# Patient Record
Sex: MALE | Race: WHITE | NOT HISPANIC OR LATINO | Employment: FULL TIME | URBAN - METROPOLITAN AREA
[De-identification: names, ages, dates, MRNs, and addresses within clinical notes are randomized per-mention and may not be internally consistent; named-entity substitution may affect disease eponyms.]

---

## 2020-07-07 ENCOUNTER — OCCUPATIONAL HEALTH (OUTPATIENT)
Dept: URGENT CARE | Facility: CLINIC | Age: 50
End: 2020-07-07
Payer: OTHER GOVERNMENT

## 2020-07-07 DIAGNOSIS — Z02.1 PRE-EMPLOYMENT EXAMINATION: Primary | ICD-10-CM

## 2020-07-07 PROCEDURE — 80305 DRUG TEST PRSMV DIR OPT OBS: CPT | Mod: S$GLB,,, | Performed by: NURSE PRACTITIONER

## 2020-07-07 PROCEDURE — 99499 PHYSICAL, BASIC COMPLEXITY: ICD-10-PCS | Mod: S$GLB,,, | Performed by: NURSE PRACTITIONER

## 2020-07-07 PROCEDURE — 99499 UNLISTED E&M SERVICE: CPT | Mod: S$GLB,,, | Performed by: NURSE PRACTITIONER

## 2020-07-07 PROCEDURE — 80305 OOH NON-DOT DRUG SCREEN: ICD-10-PCS | Mod: S$GLB,,, | Performed by: NURSE PRACTITIONER

## 2020-12-07 ENCOUNTER — OFFICE VISIT (OUTPATIENT)
Dept: URGENT CARE | Facility: CLINIC | Age: 50
End: 2020-12-07
Payer: OTHER GOVERNMENT

## 2020-12-07 VITALS
HEIGHT: 74 IN | TEMPERATURE: 98 F | HEART RATE: 79 BPM | OXYGEN SATURATION: 98 % | BODY MASS INDEX: 22.46 KG/M2 | DIASTOLIC BLOOD PRESSURE: 80 MMHG | RESPIRATION RATE: 19 BRPM | SYSTOLIC BLOOD PRESSURE: 136 MMHG | WEIGHT: 175 LBS
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
## 2020-12-07 DIAGNOSIS — M62.830 BACK MUSCLE SPASM: ICD-10-CM

## 2020-12-07 DIAGNOSIS — M54.50 ACUTE LOW BACK PAIN WITHOUT SCIATICA, UNSPECIFIED BACK PAIN LATERALITY: ICD-10-CM

## 2020-12-07 DIAGNOSIS — S39.012A STRAIN OF LUMBAR REGION, INITIAL ENCOUNTER: Primary | ICD-10-CM

## 2020-12-07 PROCEDURE — 72110 X-RAY EXAM L-2 SPINE 4/>VWS: CPT | Mod: FY,S$GLB,, | Performed by: RADIOLOGY

## 2020-12-07 PROCEDURE — 99204 OFFICE O/P NEW MOD 45 MIN: CPT | Mod: S$GLB,,, | Performed by: NURSE PRACTITIONER

## 2020-12-07 PROCEDURE — 72110 XR LUMBAR SPINE COMPLETE 5 VIEW: ICD-10-PCS | Mod: FY,S$GLB,, | Performed by: RADIOLOGY

## 2020-12-07 PROCEDURE — 99204 PR OFFICE/OUTPT VISIT, NEW, LEVL IV, 45-59 MIN: ICD-10-PCS | Mod: S$GLB,,, | Performed by: NURSE PRACTITIONER

## 2020-12-07 RX ORDER — NAPROXEN 500 MG/1
500 TABLET ORAL 2 TIMES DAILY
Qty: 60 TABLET | Refills: 0 | Status: SHIPPED | OUTPATIENT
Start: 2020-12-07

## 2020-12-07 RX ORDER — TIZANIDINE 4 MG/1
4 TABLET ORAL NIGHTLY PRN
Qty: 30 TABLET | Refills: 1 | Status: SHIPPED | OUTPATIENT
Start: 2020-12-07 | End: 2020-12-14 | Stop reason: SDUPTHER

## 2020-12-07 NOTE — LETTER
[facility]  [address] [place] [address]  Phone: [phone]  Fax: [phone]  [facility]: [phone]    Pt Name: Christopher J Mendel  Injury Date: 12/07/2020   Employee ID: [id number] Date of First Treatment: 12/07/2020   Company: [employer]      Appointment Time: 11:50 AM Arrived: 11:30 a.m.   Provider: Donte Ramírez NP Time Out: 1:41 p.m.     Office Treatment:   1. Strain of lumbar region, initial encounter    2. Acute low back pain without sciatica, unspecified back pain laterality    3. Work related injury    4. Back muscle spasm      Medications Ordered This Encounter   Medications    naproxen (NAPROSYN) 500 MG tablet    tiZANidine (ZANAFLEX) 4 MG tablet      Patient Instructions: Attention not to aggravate affected area, Daily home exercises/warm soaks, Apply ice 24-48 hours then apply heat/warm soaks    Restrictions: No lifting/pushing/pulling more than 10 lbs, Avoid frequent bending/lifting/twisting     Return Appointment: 12/14/2020 at 11:30 a.m.  [place]

## 2020-12-07 NOTE — PROGRESS NOTES
Subjective:       Patient ID: Christopher J Mendel is a 50 y.o. male.    Chief Complaint: Back Pain    Pt works for [employer] as a . Pt states he was at work on 12/3/2020 throwing away trash and noticed the dumpster trash was to high, so he jumped in it to smash the trash and pulled  a muscle in his back/love handle. Pt states he did not start feeling the pain until the next day. He was not seen anywhere for this injury, this is his initial visit. Pain today is a 4/10 constant. Pain worse when   sitting and trying to stand up. IJ    Back Pain  This is a new problem. The current episode started in the past 7 days. The problem occurs constantly. The problem is unchanged. The pain is present in the lumbar spine and thoracic spine. The quality of the pain is described as aching. The pain does not radiate. The pain is at a severity of 4/10. The pain is mild. The pain is the same all the time. The symptoms are aggravated by sitting, standing and bending. Pertinent negatives include no abdominal pain, bladder incontinence, bowel incontinence, chest pain, dysuria, numbness, tingling or weight loss. He has tried nothing for the symptoms. The treatment provided no relief.       Constitution: Negative for chills and fatigue.   HENT: Negative.    Neck: negative.   Cardiovascular: Negative.  Negative for chest pain.   Eyes: Negative.  Negative for vision loss and double vision.   Respiratory: Negative.  Negative for chest tightness and cough.    Gastrointestinal: Negative for abdominal pain and bowel incontinence.   Endocrine: negative.   Genitourinary: Negative for dysuria, urgency, bladder incontinence and hematuria.   Musculoskeletal: Positive for pain, joint pain, abnormal ROM of joint, back pain, muscle cramps (SPASMS) and muscle ache. Negative for trauma and history of spine disorder.   Skin: Negative.  Negative for rash, wound, erythema and bruising.   Allergic/Immunologic: Negative.    Neurological:  Negative.  Negative for coordination disturbances, numbness and tingling.   Hematologic/Lymphatic: Negative.    Psychiatric/Behavioral: Negative.         Objective:      Physical Exam  Vitals signs and nursing note reviewed.   Constitutional:       Appearance: He is normal weight.   HENT:      Head: Normocephalic and atraumatic.   Eyes:      Extraocular Movements: Extraocular movements intact.      Conjunctiva/sclera: Conjunctivae normal.      Pupils: Pupils are equal, round, and reactive to light.   Neck:      Musculoskeletal: Normal range of motion and neck supple.   Cardiovascular:      Rate and Rhythm: Normal rate and regular rhythm.   Pulmonary:      Effort: Pulmonary effort is normal.      Breath sounds: Normal breath sounds.   Abdominal:      General: Bowel sounds are normal.      Palpations: Abdomen is soft.   Musculoskeletal:         General: Tenderness present.      Right hip: Normal.      Left hip: Normal.      Thoracic back: Normal.      Lumbar back: He exhibits decreased range of motion, tenderness, pain and spasm. He exhibits no bony tenderness and no swelling.        Back:       Comments: PARASPINAL TTP LUMBAR SPINE.  DECREASED FLEXION AND EXTENSION. NEGATIVE SLR BILATERALLY. DTR 2 PLUS BILATERAL.  SKIN NORMAL EXAM, INTACT WARM AND DRY.    Skin:     General: Skin is warm and dry.      Capillary Refill: Capillary refill takes less than 2 seconds.      Findings: No erythema.   Neurological:      General: No focal deficit present.      Mental Status: He is alert and oriented to person, place, and time.      GCS: GCS eye subscore is 4. GCS verbal subscore is 5. GCS motor subscore is 6.      Cranial Nerves: Cranial nerves are intact.      Motor: Motor function is intact.      Coordination: Coordination is intact.      Gait: Gait is intact.      Deep Tendon Reflexes: Reflexes are normal and symmetric.   Psychiatric:         Attention and Perception: Attention normal.         Mood and Affect: Mood normal.          Behavior: Behavior normal. Behavior is cooperative.         Cognition and Memory: Cognition normal.         Assessment:       1. Strain of lumbar region, initial encounter    2. Acute low back pain without sciatica, unspecified back pain laterality    3. Work related injury    4. Back muscle spasm         X-ray Lumbar Spine Complete 5 View    Result Date: 12/7/2020  EXAMINATION: XR LUMBAR SPINE COMPLETE 5 VIEW CLINICAL HISTORY: Abnormal posture;LUMBAR PAIN;  Strain of muscle, fascia and tendon of lower back, initial encounter FINDINGS: Lumbar spine five views: There is mild DJD.  No fracture dislocation bone destruction or pars defect seen.  No instability seen.     No acute process seen. Electronically signed by: Cleveland Gee MD Date:    12/07/2020 Time:    13:35  Plan:     Jose was seen today for back pain.    Diagnoses and all orders for this visit:    Strain of lumbar region, initial encounter  -     X-Ray Lumbar Spine Complete 5 View; Future  -     tiZANidine (ZANAFLEX) 4 MG tablet; Take 1 tablet (4 mg total) by mouth nightly as needed (MUSCLE SPASMS). DO NOT DRIVE WHILE TAKING THIS MEDICATION  -     naproxen (NAPROSYN) 500 MG tablet; Take 1 tablet (500 mg total) by mouth 2 (two) times daily.    Acute low back pain without sciatica, unspecified back pain laterality  -     X-Ray Lumbar Spine Complete 5 View; Future  -     tiZANidine (ZANAFLEX) 4 MG tablet; Take 1 tablet (4 mg total) by mouth nightly as needed (MUSCLE SPASMS). DO NOT DRIVE WHILE TAKING THIS MEDICATION  -     naproxen (NAPROSYN) 500 MG tablet; Take 1 tablet (500 mg total) by mouth 2 (two) times daily.    Work related injury  -     X-Ray Lumbar Spine Complete 5 View; Future    Back muscle spasm  -     X-Ray Lumbar Spine Complete 5 View; Future  -     naproxen (NAPROSYN) 500 MG tablet; Take 1 tablet (500 mg total) by mouth 2 (two) times daily.        Medications Ordered This Encounter   Medications    naproxen (NAPROSYN) 500 MG tablet      Sig: Take 1 tablet (500 mg total) by mouth 2 (two) times daily.     Dispense:  60 tablet     Refill:  0    tiZANidine (ZANAFLEX) 4 MG tablet     Sig: Take 1 tablet (4 mg total) by mouth nightly as needed (MUSCLE SPASMS). DO NOT DRIVE WHILE TAKING THIS MEDICATION     Dispense:  30 tablet     Refill:  1     Patient Instructions: Attention not to aggravate affected area   Restrictions: No lifting/pushing/pulling more than 10 lbs, Avoid frequent bending/lifting/twisting  Follow up in about 1 week (around 12/14/2020).

## 2020-12-07 NOTE — PATIENT INSTRUCTIONS
Back Basics: A Healthy Spine  A healthy spine supports the body while letting it move freely. It does this with the help of three natural curves. Strong, flexible muscles help, too. They support the spine by keeping its curves properly aligned. The disks that cushion the bones of your spine also play a role in back fitness.    Three natural curves  The spine is made of bones (vertebrae) and pads of soft tissue (disks). These parts are arranged in three curves: cervical, thoracic, and lumbar. When properly aligned, these curves keep your body balanced. They also support your body when you move. By distributing your weight throughout your spine, the curves make back injuries less likely.  Strong, flexible muscles  Strong, flexible back muscles help support the three curves of the spine. They do so by holding the vertebrae and disks in proper alignment. Strong, flexible abdominal, hip, and leg muscles also reduce strain on the back.  The lumbar curve  The lumbar curve is the hardest-working part of the spine. It carries more weight and moves the most. Aligning this curve helps prevent damage to vertebrae, disks, and other parts of the spine.  Cushioning disks  Disks are the soft pads of tissue between the vertebrae. The disks absorb shock caused by movement. Each disk has a spongy center (nucleus) and a tougher outer ring (annulus). Movement within the nucleus allows the vertebrae to rock back and forth on the disks. This provides the flexibility needed to bend and move.       Date Last Reviewed: 10/18/2015  © 9708-2320 [employer]. [address] [place] [address]. All rights reserved. This information is not intended as a substitute for professional medical care. Always follow your healthcare professional's instructions.        Back Spasm (No Trauma)    Spasm of the back muscles can occur after a sudden forceful twisting or bending force (such as in a car accident), after a simple awkward  movement, or after lifting something heavy with poor body positioning. In any case, muscle spasm adds to the pain. Sleeping in an awkward position or on a poor quality mattress can also cause this. Some people respond to emotional stress by tensing the muscles of their back.  Pain that continues may need further evaluation or other types of treatment such as physical therapy.  You don't always need X-rays for the initial evaluation of back pain, unless you had a physical injury such as from a car accident or fall. If your pain continues and doesn't respond to medical treatment, X-rays and other tests may then be done.   Home care  · As soon as possible, start sitting or walking again to avoid problems from prolonged bed rest (muscle weakness, worsening back stiffness and pain, blood clots in the legs).  · When in bed, try to find a position of comfort. A firm mattress is best. Try lying flat on your back with pillows under your knees. You can also try lying on your side with your knees bent up toward your chest and a pillow between your knees.  · Avoid prolonged sitting, long car rides, or travel. This puts more stress on the lower back than standing or walking.   · During the first 24 to 72 hours after an injury or flare-up, apply an ice pack to the painful area for 20 minutes, then remove it for 20 minutes. Do this over a period of 60 to 90 minutes or several times a day. This will reduce swelling and pain. Always wrap ice packs in a thin towel.  · You can start with ice, then switch to heat. Heat (hot shower, hot bath, or heating pad) reduces pain, and works well for muscle spasms. Apply heat to the painful area for 20 minutes, then remove it for 20 minutes. Do this over a period of 60 to 90 minutes or several times a day. Do not sleep on a heating pad as it can burn or damage skin.  · Alternate ice and heat therapies.  · Be aware of safe lifting methods and do not lift anything over 15 pounds until all the pain  is gone.  Gentle stretching will help your back heal faster. Do this simple routine 2 to 3 times a day until your back is feeling better.  · Lie on your back with your knees bent and both feet on the ground  · Slowly raise your left knee to your chest as you flatten your lower back against the floor. Hold for 20 to 30 seconds.  · Relax and repeat the exercise with your right knee.  · Do 2 to 3 of these exercises for each leg.  · Repeat, hugging both knees to your chest at the same time.  · Do not bounce, but use a gentle pull.  Medicines  Talk to your doctor before using medicine, especially if you have other medical problems or are taking other medicines.  You may use acetaminophen or ibuprofen to control pain, unless your healthcare provider prescribed another pain medicine. If you have a chronic condition such as diabetes, liver or kidney disease, stomach ulcer, or gastrointestinal bleeding, or are taking blood thinners, talk with your healthcare provider before taking any medicines.  Be careful if you are given prescription pain medicine, narcotics, or medicine for muscle spasm. They can cause drowsiness, affect your coordination, reflexes, or judgment. Do not drive or operate heavy machinery when taking these medicines. Take pain medicine only as prescribed by your healthcare provider.  Follow-up care  Follow up with your doctor, or as advised. Physical therapy or further tests may be needed.  If X-rays were taken, they may be reviewed by a radiologist. You will be notified of any new findings that may affect your care.  Call 911  Seek emergency medical care if any of these occur:  · Trouble breathing  · Confusion  · Drowsiness or trouble awakening  · Fainting or loss of consciousness  · Rapid or very slow heart rate  · Loss of bowel or bladder control  When to seek medical advice  Call your healthcare provider right away if any of these occur:  · Pain becomes worse or spreads to your legs  · Weakness or  numbness in one or both legs  · Numbness in the groin or genital area  · Unexplained fever over 100.4ºF (38.0ºC)  · Burning or pain when passing urine  Date Last Reviewed: 6/1/2016  © 7812-5236 [employer]. [address] [place] [address]. All rights reserved. This information is not intended as a substitute for professional medical care. Always follow your healthcare professional's instructions.        Relieving Tension in Your Back  Being relaxed helps keep your mind healthy and your back ready to move. Take short breaks often. Walk around. Stretch. Switch tasks. Also give the following a try.  Make time to relax. Start by setting aside 5 minutes daily.   Deep breathing    Deep breathing is a simple way to reduce stress. You can do it almost any time you need to relax.  · Inhale slowly through your nose. Let your lungs and stomach expand.  · Hold your breath for 2 to 3 seconds.  · Exhale slowly through your mouth until your lungs feel empty. Repeat 3 to 4 times.  Relieve tension  Muscle tension can create tender spots called trigger points. The tips below may help relieve muscle tension.  · Press the trigger point if you can reach it. If not, lie on a soft tennis ball, or ask a friend to press the spot. Use steady pressure for 10 to 15 seconds. Breathe deeply. Repeat a few times.  · Massage trigger points with ice for 2 to 5 minutes. Press lightly at first. Slowly increase firmness.  Date Last Reviewed: 10/18/2015  © 9208-4718 [employer]. [address] [place] [address]. All rights reserved. This information is not intended as a substitute for professional medical care. Always follow your healthcare professional's instructions.

## 2020-12-10 ENCOUNTER — LAB VISIT (OUTPATIENT)
Dept: PRIMARY CARE CLINIC | Facility: OTHER | Age: 50
End: 2020-12-10
Attending: INTERNAL MEDICINE
Payer: OTHER GOVERNMENT

## 2020-12-10 DIAGNOSIS — Z03.818 ENCOUNTER FOR OBSERVATION FOR SUSPECTED EXPOSURE TO OTHER BIOLOGICAL AGENTS RULED OUT: ICD-10-CM

## 2020-12-10 PROCEDURE — U0003 INFECTIOUS AGENT DETECTION BY NUCLEIC ACID (DNA OR RNA); SEVERE ACUTE RESPIRATORY SYNDROME CORONAVIRUS 2 (SARS-COV-2) (CORONAVIRUS DISEASE [COVID-19]), AMPLIFIED PROBE TECHNIQUE, MAKING USE OF HIGH THROUGHPUT TECHNOLOGIES AS DESCRIBED BY CMS-2020-01-R: HCPCS

## 2020-12-12 DIAGNOSIS — U07.1 COVID-19 VIRUS DETECTED: ICD-10-CM

## 2020-12-12 LAB — SARS-COV-2 RNA RESP QL NAA+PROBE: DETECTED

## 2020-12-14 ENCOUNTER — OFFICE VISIT (OUTPATIENT)
Dept: URGENT CARE | Facility: CLINIC | Age: 50
End: 2020-12-14
Payer: OTHER MISCELLANEOUS
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
## 2020-12-14 DIAGNOSIS — S39.012D ACUTE MYOFASCIAL STRAIN OF LUMBAR REGION, SUBSEQUENT ENCOUNTER: Primary | ICD-10-CM

## 2020-12-14 PROCEDURE — 99214 PR OFFICE/OUTPT VISIT, EST, LEVL IV, 30-39 MIN: ICD-10-PCS | Mod: S$GLB,,, | Performed by: PHYSICIAN ASSISTANT

## 2020-12-14 PROCEDURE — 99214 OFFICE O/P EST MOD 30 MIN: CPT | Mod: S$GLB,,, | Performed by: PHYSICIAN ASSISTANT

## 2020-12-14 RX ORDER — LANOLIN ALCOHOL/MO/W.PET/CERES
2000 CREAM (GRAM) TOPICAL DAILY
Qty: 30 TABLET | Refills: 0 | Status: SHIPPED | OUTPATIENT
Start: 2020-12-14

## 2020-12-14 RX ORDER — PYRIDOXINE HCL (VITAMIN B6) 100 MG
200 TABLET ORAL DAILY
Qty: 30 TABLET | Refills: 0 | Status: SHIPPED | OUTPATIENT
Start: 2020-12-14

## 2020-12-14 RX ORDER — LANOLIN ALCOHOL/MO/W.PET/CERES
200 CREAM (GRAM) TOPICAL DAILY
Qty: 30 TABLET | Refills: 0 | Status: SHIPPED | OUTPATIENT
Start: 2020-12-14

## 2020-12-14 RX ORDER — TIZANIDINE 4 MG/1
4 TABLET ORAL NIGHTLY PRN
Qty: 20 TABLET | Refills: 0 | Status: SHIPPED | OUTPATIENT
Start: 2020-12-14

## 2020-12-14 NOTE — LETTER
[facility]  [address]  [place] [address]  Phone: [phone]  Fax: [phone]  [facility]: [id number]    Pt Name: Christopher J Mendel  Injury Date: 12/07/2020   Employee ID: [id number] Date of  Treatment: 12/14/2020   Company:[email or web address]      Appointment Time: 11:15 AM Arrived: 12:45 PM   Provider: Brandon Sheldon PA-C Time Out: 1:15 PM     Office Treatment:     1. Acute myofascial strain of lumbar region, subsequent encounter    2. Work related injury      Medications Ordered This Encounter   Medications    cyanocobalamin (VITAMIN B-12) 1000 MCG tablet    pyridoxine, vitamin B6, (VITAMIN B-6) 100 MG Tab    thiamine (VITAMIN B-1) 100 MG tablet    tiZANidine (ZANAFLEX) 4 MG tablet      Patient Instructions: Daily home exercises/warm soaks    Restrictions: Avoid frequent bending/lifting/twisting, No lifting/pushing/pulling more than 10 lbs     Return Appointment: 12/21/2020 at 10:30 AM  [place]

## 2020-12-14 NOTE — PROGRESS NOTES
Subjective:       Patient ID: Christopher J Mendel is a 50 y.o. male.    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 His pain today is a 3/10 only because he lifted a tire to put it on the back of a truck. Pt is taking Naproxen and Zanaflex for pain.  IJ    Back Pain  This is a recurrent problem. The current episode started 1 to 4 weeks ago. The problem occurs constantly. The problem is unchanged. The pain is present in the lumbar spine and thoracic spine. The quality of the pain is described as aching. The pain does not radiate. The pain is at a severity of 3/10. The pain is mild. The pain is the same all the time. The symptoms are aggravated by sitting, standing and bending. Pertinent negatives include no abdominal pain, bladder incontinence, bowel incontinence, chest pain, dysuria, numbness, tingling or weight loss. He has tried nothing for the symptoms. The treatment provided no relief.       Constitution: Negative for chills and fatigue.   HENT: Negative.    Neck: negative.   Cardiovascular: Negative.  Negative for chest pain.   Eyes: Negative.  Negative for vision loss and double vision.   Respiratory: Negative.  Negative for chest tightness and cough.    Gastrointestinal: Negative for abdominal pain and bowel incontinence.   Endocrine: negative.   Genitourinary: Negative for dysuria, urgency, bladder incontinence and hematuria.   Musculoskeletal: Positive for pain and back pain. Negative for trauma, joint pain, abnormal ROM of joint, muscle cramps (SPASMS), muscle ache and history of spine disorder.   Skin: Negative.  Negative for rash, wound, erythema and bruising.   Allergic/Immunologic: Negative.    Neurological: Negative.  Negative for coordination disturbances, numbness and tingling.   Hematologic/Lymphatic: Negative.    Psychiatric/Behavioral: Negative.         Objective:       Physical Exam  Nursing note reviewed.   Constitutional:       General: He is not in acute distress.     Appearance: Normal appearance. He is well-developed.   HENT:      Head: Normocephalic and atraumatic.      Right Ear: Hearing and external ear normal.      Left Ear: Hearing and external ear normal.      Nose: Nose normal. No nasal deformity.   Eyes:      General: Lids are normal.      Conjunctiva/sclera: Conjunctivae normal.      Right eye: Right conjunctiva is not injected.      Left eye: Left conjunctiva is not injected.   Neck:      Musculoskeletal: Normal range of motion. No spinous process tenderness or muscular tenderness.      Trachea: Trachea normal.   Cardiovascular:      Pulses: Normal pulses.           Dorsalis pedis pulses are 2+ on the right side and 2+ on the left side.        Posterior tibial pulses are 2+ on the right side and 2+ on the left side.   Pulmonary:      Effort: Pulmonary effort is normal. No respiratory distress.      Breath sounds: No stridor.   Musculoskeletal:      Cervical back: Normal.      Thoracic back: Normal.      Lumbar back: He exhibits decreased range of motion and tenderness. He exhibits no deformity and normal pulse.        Back:    Skin:     General: Skin is warm and dry.      Findings: No abrasion, bruising or erythema.   Neurological:      Mental Status: He is alert.      GCS: GCS eye subscore is 4. GCS verbal subscore is 5. GCS motor subscore is 6.      Sensory: No sensory deficit.      Deep Tendon Reflexes: Reflexes are normal and symmetric.      Reflex Scores:       Patellar reflexes are 2+ on the right side and 2+ on the left side.       Achilles reflexes are 2+ on the right side and 2+ on the left side.     Comments: SLR negative bilaterally.    Psychiatric:         Attention and Perception: He is attentive.         Speech: Speech normal.         Behavior: Behavior normal.         Thought Content: Thought content normal.         Assessment:       1. Acute  myofascial strain of lumbar region, subsequent encounter    2. Work related injury        Plan:         Ben RODRIGUEZ et al. Effect of Combined Diclofenac and B Vitamins (Thiamine, Pyridoxine, and Cyanocobalamin) for Low Back Pain Management: Systematic Review and Keystone-analysis. Pain Med. 2019 Sep 16.    Medications Ordered This Encounter   Medications    cyanocobalamin (VITAMIN B-12) 1000 MCG tablet     Sig: Take 2 tablets (2,000 mcg total) by mouth once daily.     Dispense:  30 tablet     Refill:  0    pyridoxine, vitamin B6, (VITAMIN B-6) 100 MG Tab     Sig: Take 2 tablets (200 mg total) by mouth once daily.     Dispense:  30 tablet     Refill:  0    thiamine (VITAMIN B-1) 100 MG tablet     Sig: Take 2 tablets (200 mg total) by mouth once daily.     Dispense:  30 tablet     Refill:  0    tiZANidine (ZANAFLEX) 4 MG tablet     Sig: Take 1 tablet (4 mg total) by mouth nightly as needed (MUSCLE SPASMS). DO NOT DRIVE WHILE TAKING THIS MEDICATION     Dispense:  20 tablet     Refill:  0     Patient Instructions: Daily home exercises/warm soaks   Restrictions: Avoid frequent bending/lifting/twisting, No lifting/pushing/pulling more than 10 lbs  Follow up in about 1 week (around 12/21/2020).

## 2020-12-21 ENCOUNTER — OFFICE VISIT (OUTPATIENT)
Dept: URGENT CARE | Facility: CLINIC | Age: 50
End: 2020-12-21
Payer: OTHER MISCELLANEOUS
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
## 2020-12-21 DIAGNOSIS — S39.012D ACUTE MYOFASCIAL STRAIN OF LUMBAR REGION, SUBSEQUENT ENCOUNTER: Primary | ICD-10-CM

## 2020-12-21 PROCEDURE — 99214 PR OFFICE/OUTPT VISIT, EST, LEVL IV, 30-39 MIN: ICD-10-PCS | Mod: S$GLB,,, | Performed by: PHYSICIAN ASSISTANT

## 2020-12-21 PROCEDURE — 99214 OFFICE O/P EST MOD 30 MIN: CPT | Mod: S$GLB,,, | Performed by: PHYSICIAN ASSISTANT

## 2020-12-21 NOTE — PROGRESS NOTES
Subjective:       Patient ID: Christopher J Mendel is a 50 y.o. male.    Chief Complaint: Back Pain (lower)    RV, Lower back (DOI 12/3/2020) [employer]- Patient states his lower back feels better but he still has minor pain and discomfort. He complains of stomach and upper gastric upset/burning and headaches over the last 3 days due to the medication. Pain level 5/10 on today. [place]    Back Pain  This is a recurrent problem. The current episode started 1 to 4 weeks ago. The problem occurs intermittently. The problem has been gradually improving since onset. The pain is present in the lumbar spine. The pain does not radiate. The pain is at a severity of 5/10. The pain is moderate. The pain is the same all the time. The symptoms are aggravated by sitting and standing. Stiffness is present in the morning. Pertinent negatives include no abdominal pain, bladder incontinence, bowel incontinence, chest pain, dysuria, fever, headaches, leg pain, numbness, paresis, paresthesias, pelvic pain, perianal numbness, tingling, weakness or weight loss. He has tried heat, NSAIDs and muscle relaxant for the symptoms. The treatment provided moderate relief.       Constitution: Negative for fatigue and fever.   HENT: Negative.    Cardiovascular: Negative for chest pain.   Eyes: Negative.    Respiratory: Negative.    Gastrointestinal: Positive for heartburn. Negative for abdominal pain and bowel incontinence.   Endocrine: negative.   Genitourinary: Negative for dysuria, urgency, bladder incontinence, hematuria and pelvic pain.   Musculoskeletal: Positive for pain, abnormal ROM of joint, back pain, muscle cramps and muscle ache. Negative for history of spine disorder.   Skin: Negative for rash.   Neurological: Negative for coordination disturbances, headaches, numbness and tingling.   Hematologic/Lymphatic: Negative.    Psychiatric/Behavioral: Negative.         Objective:      Physical Exam  Nursing note reviewed.   Constitutional:        General: He is not in acute distress.     Appearance: Normal appearance. He is well-developed.   HENT:      Head: Normocephalic and atraumatic.      Right Ear: Hearing and external ear normal.      Left Ear: Hearing and external ear normal.      Nose: Nose normal. No nasal deformity.   Eyes:      General: Lids are normal.      Conjunctiva/sclera: Conjunctivae normal.      Right eye: Right conjunctiva is not injected.      Left eye: Left conjunctiva is not injected.   Neck:      Musculoskeletal: Normal range of motion. No spinous process tenderness or muscular tenderness.      Trachea: Trachea normal.   Cardiovascular:      Pulses: Normal pulses.           Dorsalis pedis pulses are 2+ on the right side and 2+ on the left side.        Posterior tibial pulses are 2+ on the right side and 2+ on the left side.   Pulmonary:      Effort: Pulmonary effort is normal. No respiratory distress.      Breath sounds: No stridor.   Musculoskeletal:      Cervical back: Normal.      Thoracic back: Normal.      Lumbar back: Normal. He exhibits normal range of motion, no tenderness, no deformity and normal pulse.   Skin:     General: Skin is warm and dry.      Findings: No abrasion or bruising.   Neurological:      Mental Status: He is alert.      GCS: GCS eye subscore is 4. GCS verbal subscore is 5. GCS motor subscore is 6.   Psychiatric:         Attention and Perception: He is attentive.         Speech: Speech normal.         Behavior: Behavior normal.         Thought Content: Thought content normal.         Assessment:       1. Acute myofascial strain of lumbar region, subsequent encounter    2. Work related injury        Plan:       Heartburn and headaches likely secondary to B vitamins.     Patient Instructions: (Stop B vitamins. You may take naproxen 500mg by mouth twice daily as needed for back pain.)   Restrictions: Regular Duty, Discharged from Occupational Health  Follow up if symptoms worsen or fail to improve.

## 2020-12-21 NOTE — LETTER
[facility]  [address] [place] [address]  Phone: [phone]  Fax: [phone]  [facility]: [phone]    Pt Name: Christopher J Mendel  Injury Date: 12/07/2020   Employee ID: [id number] Date of Treatment: 12/21/2020   Company: "[employer]      Appointment Time: 10:15 AM Arrived: 10:20 a.m.   Provider: Brandon Sheldon PA-C Time Out: 11:35 a.m.     Office Treatment:   1. Acute myofascial strain of lumbar region, subsequent encounter    2. Work related injury          Patient Instructions: (Stop B vitamins. You may take naproxen 500mg by mouth twice daily as needed for back pain.)    Restrictions: Regular Duty, Discharged from Occupational Health     Return Appointment: Discharged from Occupational Health  [place]

## 2021-02-11 ENCOUNTER — OCCUPATIONAL HEALTH (OUTPATIENT)
Dept: URGENT CARE | Facility: CLINIC | Age: 51
End: 2021-02-11

## 2021-02-11 DIAGNOSIS — Z02.1 PHYSICAL EXAM, PRE-EMPLOYMENT: Primary | ICD-10-CM

## 2021-02-11 LAB — POC BREATH ALCOHOL: NEGATIVE

## 2021-02-11 PROCEDURE — 82075 ASSAY OF BREATH ETHANOL: CPT | Mod: S$GLB,,, | Performed by: NURSE PRACTITIONER

## 2021-02-11 PROCEDURE — 99499 UNLISTED E&M SERVICE: CPT | Mod: S$GLB,,, | Performed by: NURSE PRACTITIONER

## 2021-02-11 PROCEDURE — 80305 OOH NON-DOT DRUG SCREEN: ICD-10-PCS | Mod: S$GLB,,, | Performed by: NURSE PRACTITIONER

## 2021-02-11 PROCEDURE — 80305 DRUG TEST PRSMV DIR OPT OBS: CPT | Mod: S$GLB,,, | Performed by: NURSE PRACTITIONER

## 2021-02-11 PROCEDURE — 82075 POCT ALCOHOL BREATH TEST: ICD-10-PCS | Mod: S$GLB,,, | Performed by: NURSE PRACTITIONER

## 2021-02-11 PROCEDURE — 99499 PHYSICAL, BASIC COMPLEXITY: ICD-10-PCS | Mod: S$GLB,,, | Performed by: NURSE PRACTITIONER
